# Patient Record
Sex: MALE | Race: WHITE | Employment: FULL TIME | URBAN - METROPOLITAN AREA
[De-identification: names, ages, dates, MRNs, and addresses within clinical notes are randomized per-mention and may not be internally consistent; named-entity substitution may affect disease eponyms.]

---

## 2017-02-28 ENCOUNTER — TELEPHONE (OUTPATIENT)
Dept: FAMILY MEDICINE CLINIC | Age: 32
End: 2017-02-28

## 2017-03-02 ENCOUNTER — OFFICE VISIT (OUTPATIENT)
Dept: FAMILY MEDICINE CLINIC | Age: 32
End: 2017-03-02

## 2017-03-02 VITALS
WEIGHT: 315 LBS | HEIGHT: 73 IN | DIASTOLIC BLOOD PRESSURE: 98 MMHG | HEART RATE: 68 BPM | SYSTOLIC BLOOD PRESSURE: 152 MMHG | RESPIRATION RATE: 16 BRPM | BODY MASS INDEX: 41.75 KG/M2

## 2017-03-02 DIAGNOSIS — E66.01 MORBID OBESITY WITH BMI OF 40.0-44.9, ADULT (HCC): ICD-10-CM

## 2017-03-02 DIAGNOSIS — Z90.5 SINGLE KIDNEY: ICD-10-CM

## 2017-03-02 DIAGNOSIS — I10 ESSENTIAL HYPERTENSION: Primary | ICD-10-CM

## 2017-03-02 PROCEDURE — 99213 OFFICE O/P EST LOW 20 MIN: CPT | Performed by: NURSE PRACTITIONER

## 2017-03-02 RX ORDER — LISINOPRIL 20 MG/1
TABLET ORAL
Qty: 30 TABLET | Refills: 1 | Status: CANCELLED | OUTPATIENT
Start: 2017-03-02

## 2017-03-02 RX ORDER — LISINOPRIL AND HYDROCHLOROTHIAZIDE 20; 12.5 MG/1; MG/1
1 TABLET ORAL DAILY
Qty: 30 TABLET | Refills: 3 | Status: SHIPPED | OUTPATIENT
Start: 2017-03-02 | End: 2017-07-04 | Stop reason: SDUPTHER

## 2017-03-02 ASSESSMENT — ENCOUNTER SYMPTOMS
SHORTNESS OF BREATH: 0
ABDOMINAL PAIN: 0
COUGH: 0
NAUSEA: 0

## 2017-03-02 ASSESSMENT — PATIENT HEALTH QUESTIONNAIRE - PHQ9
2. FEELING DOWN, DEPRESSED OR HOPELESS: 0
SUM OF ALL RESPONSES TO PHQ QUESTIONS 1-9: 0
1. LITTLE INTEREST OR PLEASURE IN DOING THINGS: 0
SUM OF ALL RESPONSES TO PHQ9 QUESTIONS 1 & 2: 0

## 2017-07-25 ENCOUNTER — OFFICE VISIT (OUTPATIENT)
Dept: FAMILY MEDICINE CLINIC | Age: 32
End: 2017-07-25
Payer: COMMERCIAL

## 2017-07-25 VITALS
HEART RATE: 68 BPM | SYSTOLIC BLOOD PRESSURE: 130 MMHG | HEIGHT: 74 IN | WEIGHT: 305.4 LBS | BODY MASS INDEX: 39.19 KG/M2 | DIASTOLIC BLOOD PRESSURE: 76 MMHG | RESPIRATION RATE: 16 BRPM

## 2017-07-25 DIAGNOSIS — Z90.5 SINGLE KIDNEY: ICD-10-CM

## 2017-07-25 DIAGNOSIS — R00.2 PALPITATIONS: ICD-10-CM

## 2017-07-25 DIAGNOSIS — I10 ESSENTIAL HYPERTENSION: ICD-10-CM

## 2017-07-25 DIAGNOSIS — R42 VERTIGO: Primary | ICD-10-CM

## 2017-07-25 DIAGNOSIS — E66.9 OBESITY (BMI 30-39.9): ICD-10-CM

## 2017-07-25 PROCEDURE — 99213 OFFICE O/P EST LOW 20 MIN: CPT | Performed by: NURSE PRACTITIONER

## 2017-07-25 ASSESSMENT — ENCOUNTER SYMPTOMS
COUGH: 0
ABDOMINAL PAIN: 0
SHORTNESS OF BREATH: 0
CHEST TIGHTNESS: 0
NAUSEA: 0

## 2017-07-26 LAB
ANION GAP SERPL CALCULATED.3IONS-SCNC: 12 MMOL/L
AVERAGE GLUCOSE: 103 MG/DL (ref 66–114)
BUN BLDV-MCNC: 19 MG/DL (ref 10–20)
CALCIUM SERPL-MCNC: 10.1 MG/DL (ref 8.7–10.8)
CHLORIDE BLD-SCNC: 103 MMOL/L (ref 95–111)
CHOLESTEROL/HDL RATIO: 3.9
CHOLESTEROL: 154 MG/DL
CO2: 28 MMOL/L (ref 21–32)
CREAT SERPL-MCNC: 1.3 MG/DL (ref 0.5–1.3)
EGFR AFRICAN AMERICAN: 77
EGFR IF NONAFRICAN AMERICAN: 64
GLUCOSE: 92 MG/DL (ref 70–100)
HBA1C MFR BLD: 5.2 % (ref 4.2–5.8)
HDLC SERPL-MCNC: 40 MG/DL (ref 40–60)
LDL CHOLESTEROL CALCULATED: 83 MG/DL
LDL/HDL RATIO: 2.1
POTASSIUM SERPL-SCNC: 4.8 MMOL/L (ref 3.5–5.4)
SODIUM BLD-SCNC: 138 MMOL/L (ref 134–147)
TRIGL SERPL-MCNC: 155 MG/DL
TSH SERPL DL<=0.05 MIU/L-ACNC: 2.1 UIU/ML (ref 0.4–4.4)
VLDLC SERPL CALC-MCNC: 31 MG/DL

## 2018-05-01 ENCOUNTER — OFFICE VISIT (OUTPATIENT)
Dept: FAMILY MEDICINE CLINIC | Age: 33
End: 2018-05-01
Payer: COMMERCIAL

## 2018-05-01 VITALS
SYSTOLIC BLOOD PRESSURE: 116 MMHG | OXYGEN SATURATION: 98 % | HEIGHT: 72 IN | BODY MASS INDEX: 40.19 KG/M2 | WEIGHT: 296.7 LBS | RESPIRATION RATE: 20 BRPM | HEART RATE: 86 BPM | DIASTOLIC BLOOD PRESSURE: 78 MMHG | TEMPERATURE: 98 F

## 2018-05-01 DIAGNOSIS — J31.0 RHINOSINUSITIS: Primary | ICD-10-CM

## 2018-05-01 DIAGNOSIS — J32.9 RHINOSINUSITIS: Primary | ICD-10-CM

## 2018-05-01 DIAGNOSIS — I10 ESSENTIAL HYPERTENSION: ICD-10-CM

## 2018-05-01 PROCEDURE — 99213 OFFICE O/P EST LOW 20 MIN: CPT | Performed by: NURSE PRACTITIONER

## 2018-05-01 RX ORDER — LISINOPRIL AND HYDROCHLOROTHIAZIDE 20; 12.5 MG/1; MG/1
TABLET ORAL
Qty: 90 TABLET | Refills: 3 | Status: SHIPPED | OUTPATIENT
Start: 2018-05-01 | End: 2019-06-02 | Stop reason: SDUPTHER

## 2018-05-01 RX ORDER — DOXYCYCLINE HYCLATE 100 MG
100 TABLET ORAL 2 TIMES DAILY
Qty: 20 TABLET | Refills: 0 | Status: SHIPPED | OUTPATIENT
Start: 2018-05-01 | End: 2018-05-11

## 2018-05-01 ASSESSMENT — ENCOUNTER SYMPTOMS
ABDOMINAL PAIN: 0
SORE THROAT: 0
RHINORRHEA: 1
SINUS PAIN: 1
SHORTNESS OF BREATH: 0
CHEST TIGHTNESS: 0
NAUSEA: 0
SINUS PRESSURE: 1
COUGH: 0

## 2018-05-01 ASSESSMENT — PATIENT HEALTH QUESTIONNAIRE - PHQ9
2. FEELING DOWN, DEPRESSED OR HOPELESS: 0
SUM OF ALL RESPONSES TO PHQ QUESTIONS 1-9: 0
SUM OF ALL RESPONSES TO PHQ9 QUESTIONS 1 & 2: 0
1. LITTLE INTEREST OR PLEASURE IN DOING THINGS: 0

## 2019-06-02 DIAGNOSIS — I10 ESSENTIAL HYPERTENSION: ICD-10-CM

## 2019-06-03 RX ORDER — LISINOPRIL AND HYDROCHLOROTHIAZIDE 20; 12.5 MG/1; MG/1
TABLET ORAL
Qty: 90 TABLET | Refills: 0 | Status: SHIPPED | OUTPATIENT
Start: 2019-06-03 | End: 2019-08-22 | Stop reason: SDUPTHER

## 2019-08-22 DIAGNOSIS — I10 ESSENTIAL HYPERTENSION: ICD-10-CM

## 2019-08-22 RX ORDER — LISINOPRIL AND HYDROCHLOROTHIAZIDE 20; 12.5 MG/1; MG/1
TABLET ORAL
Qty: 90 TABLET | Refills: 3 | Status: SHIPPED | OUTPATIENT
Start: 2019-08-22 | End: 2020-08-07

## 2020-01-21 ENCOUNTER — OFFICE VISIT (OUTPATIENT)
Dept: FAMILY MEDICINE CLINIC | Age: 35
End: 2020-01-21
Payer: COMMERCIAL

## 2020-01-21 VITALS
DIASTOLIC BLOOD PRESSURE: 80 MMHG | HEART RATE: 80 BPM | BODY MASS INDEX: 38.22 KG/M2 | RESPIRATION RATE: 16 BRPM | HEIGHT: 73 IN | WEIGHT: 288.4 LBS | SYSTOLIC BLOOD PRESSURE: 122 MMHG

## 2020-01-21 PROCEDURE — 99395 PREV VISIT EST AGE 18-39: CPT | Performed by: NURSE PRACTITIONER

## 2020-01-21 SDOH — ECONOMIC STABILITY: FOOD INSECURITY: WITHIN THE PAST 12 MONTHS, THE FOOD YOU BOUGHT JUST DIDN'T LAST AND YOU DIDN'T HAVE MONEY TO GET MORE.: NEVER TRUE

## 2020-01-21 SDOH — ECONOMIC STABILITY: TRANSPORTATION INSECURITY
IN THE PAST 12 MONTHS, HAS LACK OF TRANSPORTATION KEPT YOU FROM MEETINGS, WORK, OR FROM GETTING THINGS NEEDED FOR DAILY LIVING?: NO

## 2020-01-21 SDOH — ECONOMIC STABILITY: TRANSPORTATION INSECURITY
IN THE PAST 12 MONTHS, HAS THE LACK OF TRANSPORTATION KEPT YOU FROM MEDICAL APPOINTMENTS OR FROM GETTING MEDICATIONS?: NO

## 2020-01-21 SDOH — ECONOMIC STABILITY: FOOD INSECURITY: WITHIN THE PAST 12 MONTHS, YOU WORRIED THAT YOUR FOOD WOULD RUN OUT BEFORE YOU GOT MONEY TO BUY MORE.: NEVER TRUE

## 2020-01-21 SDOH — ECONOMIC STABILITY: INCOME INSECURITY: HOW HARD IS IT FOR YOU TO PAY FOR THE VERY BASICS LIKE FOOD, HOUSING, MEDICAL CARE, AND HEATING?: NOT HARD AT ALL

## 2020-01-21 ASSESSMENT — ENCOUNTER SYMPTOMS
COUGH: 0
NAUSEA: 0
ABDOMINAL PAIN: 0
SHORTNESS OF BREATH: 0

## 2020-01-21 ASSESSMENT — PATIENT HEALTH QUESTIONNAIRE - PHQ9
SUM OF ALL RESPONSES TO PHQ QUESTIONS 1-9: 0
1. LITTLE INTEREST OR PLEASURE IN DOING THINGS: 0
2. FEELING DOWN, DEPRESSED OR HOPELESS: 0
SUM OF ALL RESPONSES TO PHQ9 QUESTIONS 1 & 2: 0
SUM OF ALL RESPONSES TO PHQ QUESTIONS 1-9: 0

## 2020-01-21 NOTE — PROGRESS NOTES
Visit Information    Have you changed or started any medications since your last visit including any over-the-counter medicines, vitamins, or herbal medicines? no   Are you having any side effects from any of your medications? -  no  Have you stopped taking any of your medications? Is so, why? -  no    Have you seen any other physician or provider since your last visit? No  Have you had any other diagnostic tests since your last visit? No  Have you been seen in the emergency room and/or had an admission to a hospital since we last saw you? No  Have you had your routine dental cleaning in the past 6 months? no    Have you activated your [employer] account? If not, what are your barriers?  No: declines     Patient Care Team:  CHASIDY Arias CNP as PCP - General (Certified Nurse Practitioner)  CHASIDY Arias CNP as PCP - [facility] Provider    Medical History Review  Past Medical, Family, and Social History reviewed and does contribute to the patient presenting condition    Health Maintenance   Topic Date Due    Varicella Vaccine (1 of 2 - 2-dose childhood series) 05/07/1986    Pneumococcal 0-64 years Vaccine (1 of 1 - PPSV23) 05/07/1991    DTaP/Tdap/Td vaccine (1 - Tdap) 05/07/1996    HIV screen  05/07/2000    Potassium monitoring  12/30/2018    Creatinine monitoring  12/30/2018    Flu vaccine (1) 01/21/2021 (Originally 9/1/2019)

## 2020-01-21 NOTE — PROGRESS NOTES
Subjective:      Patient ID: Frankie Lenz is a 29 y.o. male. HPI: Annual Exam    Chief Complaint   Patient presents with    Annual Exam     discuss keto diet due to only having 1 kidney    Labs Only     He recently started on KETO diet. 120 g protein per day. 120 oz water per day. Lost 20+ pounds. Energy level good. Is concerned about kidney function as he has only 1 kidney. Anxiety is controlled via non-pharm. Denies medication aid.      Denies CP, SOB or chest tightness    He is on Lisinopril - HCTZ 20-12.5    BP Readings from Last 3 Encounters:   01/21/20 122/80   05/01/18 116/78   07/25/17 130/76     Due for screening Lab    Lab Results   Component Value Date    LABA1C 5.2 07/25/2017     No results found for: EAG    No components found for: CHLPL  Lab Results   Component Value Date    TRIG 155 (H) 07/25/2017     Lab Results   Component Value Date    HDL 40 07/25/2017     Lab Results   Component Value Date    LDLCALC 83 07/25/2017     Lab Results   Component Value Date    LABVLDL 31 (H) 07/25/2017         Chemistry        Component Value Date/Time     12/30/2017 2103    K 4.0 12/30/2017 2103     12/30/2017 2103    CO2 28 12/30/2017 2103    BUN 18 12/30/2017 2103    CREATININE 1.31 (H) 12/30/2017 2103        Component Value Date/Time    CALCIUM 9.8 12/30/2017 2103    ALKPHOS 73 12/30/2017 2103    AST 24 12/30/2017 2103    ALT 46 (H) 12/30/2017 2103    BILITOT 0.6 12/30/2017 2103            Lab Results   Component Value Date    TSH 3.23 12/30/2017       Lab Results   Component Value Date    WBC 9.9 12/30/2017    HGB 14.9 12/30/2017    HCT 44.2 12/30/2017    MCV 79.9 (L) 12/30/2017     12/30/2017         Health Maintenance   Topic Date Due    Varicella Vaccine (1 of 2 - 2-dose childhood series) 05/07/1986    Pneumococcal 0-64 years Vaccine (1 of 1 - PPSV23) 05/07/1991    DTaP/Tdap/Td vaccine (1 - Tdap) 05/07/1996    HIV screen  05/07/2000    Potassium monitoring  12/30/2018  Creatinine monitoring  12/30/2018    Flu vaccine (1) 01/21/2021 (Originally 9/1/2019)         There is no immunization history on file for this patient. Review of Systems   Constitutional: Negative for chills and fever. HENT: Negative. Respiratory: Negative for cough and shortness of breath. Cardiovascular: Negative for chest pain. Gastrointestinal: Negative for abdominal pain and nausea. Skin: Negative for rash. Neurological: Negative for dizziness, light-headedness and headaches. Psychiatric/Behavioral: The patient is nervous/anxious. Objective:   Physical Exam  Constitutional:       General: He is not in acute distress. Eyes:      Pupils: Pupils are equal, round, and reactive to light. Neck:      Musculoskeletal: Normal range of motion and neck supple. Cardiovascular:      Rate and Rhythm: Normal rate and regular rhythm. Heart sounds: No murmur. Pulmonary:      Effort: Pulmonary effort is normal.      Breath sounds: Normal breath sounds. No wheezing. Abdominal:      General: Bowel sounds are normal. There is no distension. Palpations: Abdomen is soft. Tenderness: There is no tenderness. Musculoskeletal: Normal range of motion. General: No tenderness. Skin:     General: Skin is warm and dry. Findings: No rash. Assessment:       Diagnosis Orders   1. Well adult exam  CBC    Comprehensive Metabolic Panel    Lipid Panel    Hemoglobin A1C    TSH with Reflex   2. Essential hypertension     3.  Single kidney  Basic Metabolic Panel           Plan:      Healthy Lifestyles discussed  KETO diet and FLUID status discussed  Screening Labs  Repeat BMP in 3 months  RTO if symptoms worsen or stay the same          Hue Stahl, APRN - CNP

## 2020-01-21 NOTE — PATIENT INSTRUCTIONS
You may receive a survey about your visit with us today. The feedback from our patients helps us identify what is working well and where the service to all patients can be enhanced. Thank you! Tobacco Cessation Programs     Telephonic behavior modification  Rohini Garzon ([phone])   Counseling service for those who are ready to quit using tobacco.     Available for uninsured [address] [place] [address] Dr residents, [place] recipients, pregnant women, or patients whose health plans or employers are members of the [address] [place] [address] behavior modification   http://[place]. Quitlogix. org   Online support program to help patients through each step of the quitting process. Available 24 hours a day 7 days a week. Provides up to date researched based tool, step-by-step guides, and motivational messages. Online behavior modification   www.lungusa.org/stop-smoking/how-to-quit   HelpLine: 1-800-LUNG-[place] (607-9747)   Email questions to:  Pedro Luis@[employer]. Codelearn    Website offers resources to help tobacco users figure out their reasons for quitting and then take the big step of quitting for good. Hypnosis   Location: [facility] [address]   Contact: Beatrice Alvarado, PhD at [phone]   Hypnosis for tobacco cessation   Cost $225 for the initial session and $175 for each session afterwards. Most patients require 6-8 sessions. There is the option to submit through the patients insurance. Hypnosis and behavior modification   Location: [place] [address],  [facility] [address] [place] [address]   Contact: Dariusz Singh, PhD at [phone]  [facility] and hypnosis for nicotine addition   Cost: For uninsured patients:  Please call above phone number  Cost for insured patients depends on patients insurance plan.     Behavior modification   Location: [facility], [address] [facility] [address] [place] [address], [address] [place] [address] Road: [place] [address] include four one-on-one appointments between the patient and a isn't right for everyone, because it can cause serious bleeding. · See your doctor regularly. You may need to see the doctor more often at first or until your blood pressure comes down. · If you are taking blood pressure medicine, talk to your doctor before you take decongestants or anti-inflammatory medicine, such as ibuprofen. Some of these medicines can raise blood pressure. · Learn how to check your blood pressure at home. Lifestyle changes  · Stay at a healthy weight. This is especially important if you put on weight around the waist. Losing even 10 pounds can help you lower your blood pressure. · If your doctor recommends it, get more exercise. Walking is a good choice. Bit by bit, increase the amount you walk every day. Try for at least 30 minutes on most days of the week. You also may want to swim, bike, or do other activities. · Avoid or limit alcohol. Talk to your doctor about whether you can drink any alcohol. · Try to limit how much sodium you eat to less than 2,300 milligrams (mg) a day. Your doctor may ask you to try to eat less than 1,500 mg a day. · Eat plenty of fruits (such as bananas and oranges), vegetables, legumes, whole grains, and low-fat dairy products. · Lower the amount of saturated fat in your diet. Saturated fat is found in animal products such as milk, cheese, and meat. Limiting these foods may help you lose weight and also lower your risk for heart disease. · Do not smoke. Smoking increases your risk for heart attack and stroke. If you need help quitting, talk to your doctor about stop-smoking programs and medicines. These can increase your chances of quitting for good. When should you call for help? Call  911 anytime you think you may need emergency care. This may mean having symptoms that suggest that your blood pressure is causing a serious heart or blood vessel problem.  Your blood pressure may be over 180/120.   For example, call  911 if:    · You have symptoms of a about a medical condition or this instruction, always ask your healthcare professional. [place] [address] any warranty or liability for your use of this information.

## 2020-08-07 RX ORDER — LISINOPRIL AND HYDROCHLOROTHIAZIDE 20; 12.5 MG/1; MG/1
TABLET ORAL
Qty: 90 TABLET | Refills: 0 | Status: SHIPPED | OUTPATIENT
Start: 2020-08-07 | End: 2020-11-13

## 2021-02-16 ENCOUNTER — VIRTUAL VISIT (OUTPATIENT)
Dept: FAMILY MEDICINE CLINIC | Age: 36
End: 2021-02-16
Payer: COMMERCIAL

## 2021-02-16 DIAGNOSIS — Z00.00 WELL ADULT EXAM: Primary | ICD-10-CM

## 2021-02-16 DIAGNOSIS — I10 ESSENTIAL HYPERTENSION: ICD-10-CM

## 2021-02-16 DIAGNOSIS — M79.675 GREAT TOE PAIN, LEFT: ICD-10-CM

## 2021-02-16 DIAGNOSIS — Z90.5 SINGLE KIDNEY: ICD-10-CM

## 2021-02-16 PROCEDURE — 99395 PREV VISIT EST AGE 18-39: CPT | Performed by: NURSE PRACTITIONER

## 2021-02-16 RX ORDER — PREDNISONE 50 MG/1
50 TABLET ORAL DAILY
Qty: 4 TABLET | Refills: 0 | Status: SHIPPED | OUTPATIENT
Start: 2021-02-16 | End: 2021-02-20

## 2021-02-16 ASSESSMENT — ENCOUNTER SYMPTOMS
COUGH: 0
ABDOMINAL PAIN: 0
SHORTNESS OF BREATH: 0
NAUSEA: 0

## 2021-02-16 NOTE — PROGRESS NOTES
Subjective:      Patient ID: Leny Castillo is a 28 y.o. male    HPI: Acute for     TELEHEALTH EVALUATION -- Audio/Visual (During [facility]-17 public health emergency)    Patient identification was verified at the start of the visit: Yes    Services were provided through a video synchronous discussion virtually to substitute for in-person clinic visit. Patient and provider were located at their individual homes. Patient has requested an audio/video evaluation for the following concern(s):    Chief Complaint   Patient presents with    Gout       Left big toe. Sensitive to touch. Denies redness or swelling. Worse when he is sitting up. Better when walking. Onset of 4-5 days. No issues in past.  Increase in protein shakes recent. No change in activity. No injury. Patient Active Problem List   Diagnosis    Hypertension    Obesity    Asthma    Single kidney     He has only 1 kidney.      Anxiety is controlled via non-pharm.   Denies medication aid.      Denies CP, SOB or chest tightness     He is on Lisinopril - HCTZ 20-12.5    BP Readings from Last 3 Encounters:   01/21/20 122/80   05/01/18 116/78   07/25/17 130/76     Due for annual labs    Lab Results   Component Value Date    LABA1C 5.2 07/25/2017     No results found for: EAG    No components found for: CHLPL  Lab Results   Component Value Date    TRIG 155 (H) 07/25/2017     Lab Results   Component Value Date    HDL 40 07/25/2017     Lab Results   Component Value Date    LDLCALC 83 07/25/2017     Lab Results   Component Value Date    LABVLDL 31 (H) 07/25/2017         Chemistry        Component Value Date/Time     12/30/2017 2103    K 4.0 12/30/2017 2103     12/30/2017 2103    CO2 28 12/30/2017 2103    BUN 18 12/30/2017 2103    CREATININE 1.31 (H) 12/30/2017 2103        Component Value Date/Time    CALCIUM 9.8 12/30/2017 2103    ALKPHOS 73 12/30/2017 2103    AST 24 12/30/2017 2103    ALT 46 (H) 12/30/2017 2103 BILITOT 0.6 12/30/2017 2103            Lab Results   Component Value Date    TSH 3.23 12/30/2017       Lab Results   Component Value Date    WBC 9.9 12/30/2017    HGB 14.9 12/30/2017    HCT 44.2 12/30/2017    MCV 79.9 (L) 12/30/2017     12/30/2017         Health Maintenance   Topic Date Due    Hepatitis C screen  1985    Varicella vaccine (1 of 2 - 2-dose childhood series) 05/07/1986    Pneumococcal 0-64 years Vaccine (1 of 1 - PPSV23) 05/07/1991    HIV screen  05/07/2000    DTaP/Tdap/Td vaccine (1 - Tdap) 05/07/2004    Potassium monitoring  12/30/2018    Creatinine monitoring  12/30/2018    Flu vaccine (1) 09/01/2020    Hepatitis A vaccine  Aged Out    Hepatitis B vaccine  Aged Out    Hib vaccine  Aged Out    Meningococcal (ACWY) vaccine  Aged Out         There is no immunization history on file for this patient. Review of Systems   Constitutional: Negative for chills and fever. HENT: Negative. Respiratory: Negative for cough and shortness of breath. Cardiovascular: Negative for chest pain. Gastrointestinal: Negative for abdominal pain and nausea. Musculoskeletal: Positive for arthralgias. Skin: Negative for rash. Neurological: Negative for dizziness, light-headedness and headaches. Psychiatric/Behavioral: Negative. Objective:   Physical Exam  Constitutional:       General: He is not in acute distress. Appearance: He is not ill-appearing. Pulmonary:      Effort: Pulmonary effort is normal. No respiratory distress. Neurological:      Mental Status: He is alert and oriented to person, place, and time. Psychiatric:         Mood and Affect: Mood normal.         Behavior: Behavior normal.         Assessment:       Diagnosis Orders   1. Well adult exam  CBC    Lipid Panel    Comprehensive Metabolic Panel    Hemoglobin A1C    TSH with Reflex    Urinalysis With Microscopic    Uric Acid   2.  Great toe pain, left  predniSONE (DELTASONE) 50 MG tablet

## 2021-11-24 ENCOUNTER — VIRTUAL VISIT (OUTPATIENT)
Dept: FAMILY MEDICINE CLINIC | Age: 36
End: 2021-11-24
Payer: COMMERCIAL

## 2021-11-24 DIAGNOSIS — K04.7 DENTAL INFECTION: Primary | ICD-10-CM

## 2021-11-24 PROCEDURE — 99213 OFFICE O/P EST LOW 20 MIN: CPT | Performed by: NURSE PRACTITIONER

## 2021-11-24 RX ORDER — AMOXICILLIN 875 MG/1
875 TABLET, COATED ORAL 2 TIMES DAILY
Qty: 20 TABLET | Refills: 0 | Status: SHIPPED | OUTPATIENT
Start: 2021-11-24 | End: 2021-12-04

## 2021-11-24 ASSESSMENT — ENCOUNTER SYMPTOMS
ABDOMINAL PAIN: 0
SHORTNESS OF BREATH: 0
COUGH: 0
NAUSEA: 0

## 2021-11-24 NOTE — PROGRESS NOTES
Subjective:      Patient ID: Yvette Molina is a 39 y.o. male    HPI: Acute for dental infection    TELEHEALTH EVALUATION -- Audio/Visual (During [facility]-57 public health emergency)    Patient identification was verified at the start of the visit: Yes    Services were provided through a video synchronous discussion virtually to substitute for in-person clinic visit. Patient and provider were located at their individual homes. Patient has requested an audio/video evaluation for the following concern(s):    Chief Complaint   Patient presents with    Dental Problem       Tooth abscess back right side. [place] tooth that needs pulled. Gums swelling, pain. Facial swelling. Onset of 3 days with symptoms. No fevers. No trouble swallowing. Pain is minimum. Unable to see dentist with holiday week. Patient Active Problem List   Diagnosis    Hypertension    Obesity    Asthma    Single kidney       Review of Systems   Constitutional: Negative for chills and fever. HENT: Positive for dental problem. Respiratory: Negative for cough and shortness of breath. Cardiovascular: Negative for chest pain. Gastrointestinal: Negative for abdominal pain and nausea. Skin: Negative for rash. Neurological: Negative for dizziness, light-headedness and headaches. Psychiatric/Behavioral: Negative. Objective:   Physical Exam  Constitutional:       General: He is not in acute distress. Appearance: He is not ill-appearing. Pulmonary:      Effort: Pulmonary effort is normal. No respiratory distress. Neurological:      Mental Status: He is alert and oriented to person, place, and time. Psychiatric:         Mood and Affect: Mood normal.         Behavior: Behavior normal.         Assessment:       Diagnosis Orders   1.  Dental infection  amoxicillin (AMOXIL) 875 MG tablet       Plan:     Orders as above   Fluids and rest  [place] gargles  RTO if symptoms worsen or stay the same             Due to this being a TeleHealth encounter (During [facility]-94 public health emergency), evaluation of the following organ systems was limited: Vitals/Constitutional/EENT/Resp/CV/GI//MS/Neuro/Skin/Heme-Lymph-Imm. Pursuant to the emergency declaration under the [address] [place] [address], [address] [place] [address] authority and the [employer] and [employer] Act, this Virtual Visit was conducted with patient's (and/or legal guardian's) consent, to reduce the patient's risk of exposure to COVID-19 and provide necessary medical care. The patient (and/or legal guardian) has also been advised to contact this office for worsening conditions or problems, and seek emergency medical treatment and/or call 911 if deemed necessary. --Huel Cockayne, APRN - CNP on 11/24/2021 at 1:28 PM    An electronic signature was used to authenticate this note.      11/24/2021

## 2022-05-23 DIAGNOSIS — I10 ESSENTIAL HYPERTENSION: ICD-10-CM

## 2022-05-23 RX ORDER — LISINOPRIL AND HYDROCHLOROTHIAZIDE 20; 12.5 MG/1; MG/1
TABLET ORAL
Qty: 90 TABLET | Refills: 1 | Status: SHIPPED | OUTPATIENT
Start: 2022-05-23 | End: 2022-11-01

## 2022-10-31 DIAGNOSIS — I10 ESSENTIAL HYPERTENSION: ICD-10-CM

## 2022-11-01 RX ORDER — LISINOPRIL AND HYDROCHLOROTHIAZIDE 20; 12.5 MG/1; MG/1
TABLET ORAL
Qty: 90 TABLET | Refills: 3 | Status: SHIPPED | OUTPATIENT
Start: 2022-11-01

## 2023-09-25 DIAGNOSIS — I10 ESSENTIAL HYPERTENSION: ICD-10-CM

## 2023-09-25 RX ORDER — LISINOPRIL AND HYDROCHLOROTHIAZIDE 20; 12.5 MG/1; MG/1
TABLET ORAL
Qty: 90 TABLET | Refills: 0 | OUTPATIENT
Start: 2023-09-25

## 2023-09-28 DIAGNOSIS — I10 ESSENTIAL HYPERTENSION: ICD-10-CM

## 2023-09-28 RX ORDER — LISINOPRIL AND HYDROCHLOROTHIAZIDE 20; 12.5 MG/1; MG/1
TABLET ORAL
Qty: 90 TABLET | Refills: 0 | OUTPATIENT
Start: 2023-09-28

## 2023-09-28 RX ORDER — LISINOPRIL AND HYDROCHLOROTHIAZIDE 20; 12.5 MG/1; MG/1
1 TABLET ORAL DAILY
Qty: 30 TABLET | Refills: 0 | Status: SHIPPED | OUTPATIENT
Start: 2023-09-28

## 2023-09-28 NOTE — TELEPHONE ENCOUNTER
Patient notified and understanding voiced. Appt given for 10/4/2023. Requesting refill until appt.   Will check with pharmacy tomorrow after 10am.

## 2023-10-04 ENCOUNTER — OFFICE VISIT (OUTPATIENT)
Dept: FAMILY MEDICINE CLINIC | Age: 38
End: 2023-10-04
Payer: COMMERCIAL

## 2023-10-04 VITALS
HEART RATE: 76 BPM | DIASTOLIC BLOOD PRESSURE: 86 MMHG | BODY MASS INDEX: 37.62 KG/M2 | RESPIRATION RATE: 16 BRPM | WEIGHT: 283.9 LBS | HEIGHT: 73 IN | SYSTOLIC BLOOD PRESSURE: 130 MMHG

## 2023-10-04 DIAGNOSIS — E66.01 SEVERE OBESITY (BMI 35.0-39.9) WITH COMORBIDITY (HCC): ICD-10-CM

## 2023-10-04 DIAGNOSIS — I10 ESSENTIAL HYPERTENSION: ICD-10-CM

## 2023-10-04 DIAGNOSIS — Z00.00 WELL ADULT EXAM: Primary | ICD-10-CM

## 2023-10-04 LAB
AVERAGE GLUCOSE: 114 MG/DL
HBA1C MFR BLD: 5.6 % (ref 4.2–5.6)

## 2023-10-04 PROCEDURE — 3075F SYST BP GE 130 - 139MM HG: CPT | Performed by: NURSE PRACTITIONER

## 2023-10-04 PROCEDURE — 99395 PREV VISIT EST AGE 18-39: CPT | Performed by: NURSE PRACTITIONER

## 2023-10-04 PROCEDURE — 3079F DIAST BP 80-89 MM HG: CPT | Performed by: NURSE PRACTITIONER

## 2023-10-04 RX ORDER — OMEPRAZOLE 20 MG/1
20 CAPSULE, DELAYED RELEASE ORAL DAILY
COMMUNITY

## 2023-10-04 RX ORDER — LISINOPRIL AND HYDROCHLOROTHIAZIDE 20; 12.5 MG/1; MG/1
1 TABLET ORAL DAILY
Qty: 90 TABLET | Refills: 3 | Status: SHIPPED | OUTPATIENT
Start: 2023-10-04

## 2023-10-04 SDOH — ECONOMIC STABILITY: INCOME INSECURITY: HOW HARD IS IT FOR YOU TO PAY FOR THE VERY BASICS LIKE FOOD, HOUSING, MEDICAL CARE, AND HEATING?: NOT HARD AT ALL

## 2023-10-04 SDOH — ECONOMIC STABILITY: FOOD INSECURITY: WITHIN THE PAST 12 MONTHS, YOU WORRIED THAT YOUR FOOD WOULD RUN OUT BEFORE YOU GOT MONEY TO BUY MORE.: NEVER TRUE

## 2023-10-04 SDOH — ECONOMIC STABILITY: FOOD INSECURITY: WITHIN THE PAST 12 MONTHS, THE FOOD YOU BOUGHT JUST DIDN'T LAST AND YOU DIDN'T HAVE MONEY TO GET MORE.: NEVER TRUE

## 2023-10-04 SDOH — ECONOMIC STABILITY: HOUSING INSECURITY
IN THE LAST 12 MONTHS, WAS THERE A TIME WHEN YOU DID NOT HAVE A STEADY PLACE TO SLEEP OR SLEPT IN A SHELTER (INCLUDING NOW)?: NO

## 2023-10-04 ASSESSMENT — PATIENT HEALTH QUESTIONNAIRE - PHQ9
SUM OF ALL RESPONSES TO PHQ QUESTIONS 1-9: 0
SUM OF ALL RESPONSES TO PHQ9 QUESTIONS 1 & 2: 0
2. FEELING DOWN, DEPRESSED OR HOPELESS: 0
1. LITTLE INTEREST OR PLEASURE IN DOING THINGS: 0

## 2023-10-04 ASSESSMENT — ENCOUNTER SYMPTOMS: SHORTNESS OF BREATH: 0

## 2023-10-04 NOTE — PROGRESS NOTES
Subjective:      Patient ID: Lu Correia is a 45 y.o. male    HPI: Annual Exam    Chief Complaint   Patient presents with    Annual Exam    Neck Pain     Arthritis? Weight Management       5 days a week at the gym. Did this for 14 weeks straight. Lack of results seen with weight loss. Wonders about low testosterone. Complains of neck pain. No benefit with chiropractor. Avoid NSAID due to solitary kidney. Tightness in neck worse as day goes on. Denies shooting pain or arm weakness. Patient Active Problem List   Diagnosis    Hypertension    Obesity    Asthma    Single kidney     He has only 1 kidney. Anxiety is controlled via non-pharm. Denies medication aid.       Denies CP, SOB or chest tightness     He is on Lisinopril - HCTZ 20-12.5    BP Readings from Last 3 Encounters:   10/04/23 130/86   01/21/20 122/80   05/01/18 116/78     Due for annual labs    Lab Results   Component Value Date    LABA1C 5.2 07/25/2017     No results found for: \"EAG\"    No components found for: \"CHLPL\"  Lab Results   Component Value Date    TRIG 155 (H) 07/25/2017     Lab Results   Component Value Date    HDL 40 07/25/2017     Lab Results   Component Value Date    LDLCALC 83 07/25/2017     Lab Results   Component Value Date    LABVLDL 31 (H) 07/25/2017         Chemistry        Component Value Date/Time     12/30/2017 2103    K 4.0 12/30/2017 2103     12/30/2017 2103    CO2 28 12/30/2017 2103    BUN 18 12/30/2017 2103    CREATININE 1.31 (H) 12/30/2017 2103        Component Value Date/Time    CALCIUM 9.8 12/30/2017 2103    ALKPHOS 73 12/30/2017 2103    AST 24 12/30/2017 2103    ALT 46 (H) 12/30/2017 2103    BILITOT 0.6 12/30/2017 2103            Lab Results   Component Value Date    TSH 3.23 12/30/2017       Lab Results   Component Value Date    WBC 9.9 12/30/2017    HGB 14.9 12/30/2017    HCT 44.2 12/30/2017    MCV 79.9 (L) 12/30/2017     12/30/2017         Health Maintenance   Topic Date Due

## 2023-10-05 LAB
ABSOLUTE BASO #: 0.06 K/UL (ref 0–0.2)
ABSOLUTE EOS #: 0.26 K/UL (ref 0–0.5)
ABSOLUTE LYMPH #: 2.23 K/UL (ref 1–4)
ABSOLUTE MONO #: 0.47 K/UL (ref 0.2–1)
ABSOLUTE NEUT #: 4.13 K/UL (ref 1.5–7.5)
ALBUMIN SERPL-MCNC: 4.6 G/DL (ref 3.5–5.2)
ALK PHOSPHATASE: 78 U/L (ref 40–117)
ALT SERPL-CCNC: 25 U/L (ref 5–50)
ANION GAP SERPL CALCULATED.3IONS-SCNC: 13 MEQ/L (ref 7–16)
AST SERPL-CCNC: 16 U/L (ref 9–50)
BASOPHILS RELATIVE PERCENT: 0.8 %
BILIRUB SERPL-MCNC: 0.2 MG/DL
BUN BLDV-MCNC: 27 MG/DL (ref 6–20)
CALCIUM SERPL-MCNC: 9.6 MG/DL (ref 8.5–10.5)
CHLORIDE BLD-SCNC: 102 MEQ/L (ref 95–107)
CHOLESTEROL/HDL RATIO: 2.7 RATIO
CHOLESTEROL: 175 MG/DL
CO2: 24 MEQ/L (ref 19–31)
CREAT SERPL-MCNC: 1.24 MG/DL (ref 0.8–1.4)
EGFR IF NONAFRICAN AMERICAN: 76 ML/MIN/1.73
EOSINOPHILS RELATIVE PERCENT: 3.6 %
GLUCOSE: 94 MG/DL (ref 70–99)
HCT VFR BLD CALC: 44.6 % (ref 40–51)
HDLC SERPL-MCNC: 64 MG/DL
HEMOGLOBIN: 15 G/DL (ref 13.5–17)
LDL CHOLESTEROL CALCULATED: 101 MG/DL
LDL/HDL RATIO: 1.6 RATIO
LYMPHOCYTE %: 31.1 %
MCH RBC QN AUTO: 26.6 PG (ref 25–33)
MCHC RBC AUTO-ENTMCNC: 33.6 G/DL (ref 31–36)
MCV RBC AUTO: 79.1 FL (ref 80–99)
MONOCYTES # BLD: 6.5 %
NEUTROPHILS RELATIVE PERCENT: 57.6 %
PDW BLD-RTO: 13.3 % (ref 11.5–15)
PLATELETS: 219 K/UL (ref 130–400)
PMV BLD AUTO: 10.6 FL (ref 9.3–13)
POTASSIUM SERPL-SCNC: 4.7 MEQ/L (ref 3.5–5.4)
RBC: 5.64 M/UL (ref 4.5–6.1)
SODIUM BLD-SCNC: 139 MEQ/L (ref 133–146)
TOTAL PROTEIN: 6.8 G/DL (ref 6.1–8.3)
TRIGL SERPL-MCNC: 49 MG/DL
TSH SERPL DL<=0.05 MIU/L-ACNC: 3.48 UIU/ML (ref 0.4–4.1)
URIC ACID: 8.2 MG/DL (ref 3.7–8)
VLDLC SERPL CALC-MCNC: 10 MG/DL
WBC: 7.2 K/UL (ref 3.5–11)

## 2023-10-06 LAB
SEX HORMONE BINDING GLOBULIN: 17 NMOL/L (ref 16.5–55.9)
TESTOSTERONE FREE, CALC: 88.7 PG/ML (ref 47–244)
TESTOSTERONE FREE: 323 NG/DL (ref 300–1080)

## 2024-07-26 ENCOUNTER — HOSPITAL ENCOUNTER (EMERGENCY)
Age: 39
Discharge: HOME | End: 2024-07-26
Payer: COMMERCIAL

## 2024-07-26 VITALS
HEIGHT: 73 IN | SYSTOLIC BLOOD PRESSURE: 115 MMHG | WEIGHT: 290 LBS | DIASTOLIC BLOOD PRESSURE: 81 MMHG | BODY MASS INDEX: 38.43 KG/M2 | HEART RATE: 84 BPM | RESPIRATION RATE: 18 BRPM | OXYGEN SATURATION: 96 % | TEMPERATURE: 97.5 F

## 2024-07-26 DIAGNOSIS — L08.9 LOCAL INFECTION OF SKIN AND SUBCUTANEOUS TISSUE: Primary | ICD-10-CM

## 2024-07-26 PROCEDURE — 99203 OFFICE O/P NEW LOW 30 MIN: CPT

## 2024-07-26 PROCEDURE — 87205 SMEAR GRAM STAIN: CPT

## 2024-07-26 PROCEDURE — 99213 OFFICE O/P EST LOW 20 MIN: CPT

## 2024-07-26 PROCEDURE — 87147 CULTURE TYPE IMMUNOLOGIC: CPT

## 2024-07-26 PROCEDURE — 87070 CULTURE OTHR SPECIMN AEROBIC: CPT

## 2024-07-26 RX ORDER — FAMOTIDINE 20 MG/1
20 TABLET, FILM COATED ORAL 2 TIMES DAILY
COMMUNITY

## 2024-07-26 RX ORDER — DOXYCYCLINE HYCLATE 100 MG
100 TABLET ORAL 2 TIMES DAILY
Qty: 20 TABLET | Refills: 0 | Status: SHIPPED | OUTPATIENT
Start: 2024-07-26 | End: 2024-08-05

## 2024-07-26 ASSESSMENT — PAIN SCALES - GENERAL: PAINLEVEL_OUTOF10: 10

## 2024-07-26 ASSESSMENT — PAIN DESCRIPTION - PAIN TYPE: TYPE: ACUTE PAIN

## 2024-07-26 ASSESSMENT — PAIN DESCRIPTION - ONSET: ONSET: ON-GOING

## 2024-07-26 ASSESSMENT — PAIN DESCRIPTION - ORIENTATION: ORIENTATION: RIGHT

## 2024-07-26 ASSESSMENT — PAIN - FUNCTIONAL ASSESSMENT
PAIN_FUNCTIONAL_ASSESSMENT: PREVENTS OR INTERFERES SOME ACTIVE ACTIVITIES AND ADLS
PAIN_FUNCTIONAL_ASSESSMENT: 0-10

## 2024-07-26 ASSESSMENT — PAIN DESCRIPTION - FREQUENCY: FREQUENCY: INTERMITTENT

## 2024-07-26 ASSESSMENT — PAIN DESCRIPTION - DESCRIPTORS: DESCRIPTORS: SHARP

## 2024-07-26 ASSESSMENT — PAIN DESCRIPTION - LOCATION: LOCATION: TOE (COMMENT WHICH ONE)

## 2024-07-26 NOTE — DISCHARGE INSTRUCTIONS
Culture results in 24-48 hours.  Antibiotic as prescribed.  Continue to keep clean and covered.   Warm compress, Epsin salt soaks.  Increase water intake, frequent hand washing.  Tylenol / Ibuprofen as needed for fever and or pain.  Follow up with PCP in 3-5 days if no improvement or sooner with worsening symptoms.

## 2024-07-26 NOTE — ED PROVIDER NOTES
[facility] URGENT CARE  Urgent Care Encounter       CHIEF COMPLAINT       Chief Complaint   Patient presents with    Toe Pain     Right great toe pain and redness for about 9 weeks       Nurses Notes reviewed and I agree except as noted in the HPI.  HISTORY OF PRESENT ILLNESS   Rubén Walters is a 39 y.o. male who presents with concerns of right great toe pain and redness for the past nine weeks. Patient reports originally had drainage from this area when first occurred, has been applying OTC Neosporin and Band-Aid. [place] he was at the ocean two weeks ago and believes the salt water helped his symptoms.      HPI    REVIEW OF SYSTEMS     Review of Systems   Constitutional:  Negative for fatigue and fever.   Musculoskeletal:         Right great toe redness, swelling, pain to lateral aspect of nail   All other systems reviewed and are negative.      PAST MEDICAL HISTORY         Diagnosis Date    Anxiety     Hypertension     Obesity        SURGICALHISTORY     Patient  has a past surgical history that includes Kidney removal and Neck surgery.    CURRENT MEDICATIONS       Discharge Medication List as of 7/26/2024  7:33 PM        CONTINUE these medications which have NOT CHANGED    Details   famotidine (PEPCID) 20 MG tablet Take 1 tablet by mouth 2 times dailyHistorical Med      lisinopril-hydroCHLOROthiazide (PRINZIDE;ZESTORETIC) 20-12.5 MG per tablet Take 1 tablet by mouth daily, Disp-90 tablet, R-3Normal             ALLERGIES     Patient is is allergic to amlodipine and suprax [cefixime].    Patients   There is no immunization history on file for this patient.    FAMILY HISTORY     Patient's family history includes Depression in his mother; High Blood Pressure in his mother; Thyroid Disease in his mother.    SOCIAL HISTORY     Patient  reports that he has never smoked. His smokeless tobacco use includes chew. He reports current alcohol use. He reports that he does not use drugs.    PHYSICAL EXAM     ED  TRIAGE VITALS  BP: 115/81, Temp: 97.5 °F (36.4 °C), Pulse: 84, Respirations: 18, SpO2: 96 %,Estimated body mass index is 38.26 kg/m² as calculated from the following:    Height as of this encounter: 1.854 m (6' 1\").    Weight as of this encounter: 131.5 kg (290 lb).,No LMP for male patient.    Physical Exam  Vitals and nursing note reviewed.   Constitutional:       General: He is not in acute distress.     Appearance: Normal appearance. He is obese. He is not ill-appearing, toxic-appearing or diaphoretic.   Eyes:      Pupils: Pupils are equal, round, and reactive to light.   Cardiovascular:      Rate and Rhythm: Normal rate and regular rhythm.      Pulses:           Dorsalis pedis pulses are 2+ on the right side.        Posterior tibial pulses are 2+ on the right side.      Heart sounds: Normal heart sounds.   Pulmonary:      Effort: Pulmonary effort is normal.   Musculoskeletal:        Feet:    Feet:      Right foot:      Skin integrity: Erythema and warmth present.      Toenail Condition: Right toenails are ingrown.   Skin:     General: Skin is warm and dry.      Capillary Refill: Capillary refill takes less than 2 seconds.      Findings: Abrasion and erythema present.          Neurological:      General: No focal deficit present.      Mental Status: He is alert.   Psychiatric:         Mood and Affect: Mood normal.         DIAGNOSTIC RESULTS     Labs:No results found for this visit on 07/26/24.    IMAGING:    No orders to display         EKG:      URGENT CARE COURSE:     Vitals:    07/26/24 1918   BP: 115/81   Pulse: 84   Resp: 18   Temp: 97.5 °F (36.4 °C)   TempSrc: Temporal   SpO2: 96%   Weight: 131.5 kg (290 lb)   Height: 1.854 m (6' 1\")       Medications - No data to display         PROCEDURES:  None    FINAL IMPRESSION      1. Local infection of skin and subcutaneous tissue          DISPOSITION/ PLAN     Patient seen and evaluated for the above. Exam suggestive of local skin infection or paronychia. Drainage

## 2024-07-26 NOTE — ED NOTES
PT GIVEN DISCHARGE INSTRUCTIONS, VERBALIZES UNDERSTANDING.  PT ASSESSMENT UNCHANGED, DISCHARGED IN STABLE CONDITION.        Negrito Clinton, RN  07/26/24 [id number]

## 2024-07-28 LAB
BACTERIA SPEC AEROBE CULT: NORMAL
GRAM STN SPEC: NORMAL

## 2024-07-29 ENCOUNTER — TELEPHONE (OUTPATIENT)
Dept: FAMILY MEDICINE CLINIC | Age: 39
End: 2024-07-29

## 2024-09-09 DIAGNOSIS — I10 ESSENTIAL HYPERTENSION: ICD-10-CM

## 2024-09-10 RX ORDER — LISINOPRIL AND HYDROCHLOROTHIAZIDE 12.5; 2 MG/1; MG/1
1 TABLET ORAL DAILY
Qty: 90 TABLET | Refills: 3 | Status: SHIPPED | OUTPATIENT
Start: 2024-09-10

## 2025-07-13 DIAGNOSIS — I10 ESSENTIAL HYPERTENSION: ICD-10-CM

## 2025-07-13 RX ORDER — LISINOPRIL AND HYDROCHLOROTHIAZIDE 12.5; 2 MG/1; MG/1
1 TABLET ORAL DAILY
Qty: 90 TABLET | Refills: 0 | OUTPATIENT
Start: 2025-07-13

## 2025-07-15 RX ORDER — LISINOPRIL AND HYDROCHLOROTHIAZIDE 12.5; 2 MG/1; MG/1
1 TABLET ORAL DAILY
Qty: 30 TABLET | Refills: 0 | Status: SHIPPED | OUTPATIENT
Start: 2025-07-15

## 2025-07-15 NOTE — TELEPHONE ENCOUNTER
Patient called and scheduled an appointment. He is scheduled for 7/22/25. He would like a refill at least to get him to that appointment.

## 2025-07-22 ENCOUNTER — OFFICE VISIT (OUTPATIENT)
Dept: FAMILY MEDICINE CLINIC | Age: 40
End: 2025-07-22
Payer: COMMERCIAL

## 2025-07-22 VITALS
SYSTOLIC BLOOD PRESSURE: 134 MMHG | BODY MASS INDEX: 40.56 KG/M2 | HEIGHT: 73 IN | RESPIRATION RATE: 16 BRPM | WEIGHT: 306 LBS | HEART RATE: 88 BPM | DIASTOLIC BLOOD PRESSURE: 70 MMHG

## 2025-07-22 DIAGNOSIS — E66.01 MORBID OBESITY WITH BMI OF 40.0-44.9, ADULT (HCC): ICD-10-CM

## 2025-07-22 DIAGNOSIS — Z00.00 WELL ADULT EXAM: Primary | ICD-10-CM

## 2025-07-22 DIAGNOSIS — I10 ESSENTIAL HYPERTENSION: ICD-10-CM

## 2025-07-22 PROCEDURE — 99396 PREV VISIT EST AGE 40-64: CPT | Performed by: NURSE PRACTITIONER

## 2025-07-22 PROCEDURE — 3078F DIAST BP <80 MM HG: CPT | Performed by: NURSE PRACTITIONER

## 2025-07-22 PROCEDURE — 3075F SYST BP GE 130 - 139MM HG: CPT | Performed by: NURSE PRACTITIONER

## 2025-07-22 RX ORDER — PHENTERMINE HYDROCHLORIDE 37.5 MG/1
37.5 CAPSULE ORAL EVERY MORNING
Qty: 30 CAPSULE | Refills: 0 | Status: SHIPPED | OUTPATIENT
Start: 2025-07-22 | End: 2025-08-21

## 2025-07-22 RX ORDER — LISINOPRIL AND HYDROCHLOROTHIAZIDE 12.5; 2 MG/1; MG/1
1 TABLET ORAL DAILY
Qty: 90 TABLET | Refills: 3 | Status: SHIPPED | OUTPATIENT
Start: 2025-07-22

## 2025-07-22 SDOH — ECONOMIC STABILITY: FOOD INSECURITY: WITHIN THE PAST 12 MONTHS, YOU WORRIED THAT YOUR FOOD WOULD RUN OUT BEFORE YOU GOT MONEY TO BUY MORE.: NEVER TRUE

## 2025-07-22 SDOH — ECONOMIC STABILITY: FOOD INSECURITY: WITHIN THE PAST 12 MONTHS, THE FOOD YOU BOUGHT JUST DIDN'T LAST AND YOU DIDN'T HAVE MONEY TO GET MORE.: NEVER TRUE

## 2025-07-22 ASSESSMENT — PATIENT HEALTH QUESTIONNAIRE - PHQ9
2. FEELING DOWN, DEPRESSED OR HOPELESS: NOT AT ALL
SUM OF ALL RESPONSES TO PHQ QUESTIONS 1-9: 0
SUM OF ALL RESPONSES TO PHQ QUESTIONS 1-9: 0
1. LITTLE INTEREST OR PLEASURE IN DOING THINGS: NOT AT ALL
SUM OF ALL RESPONSES TO PHQ QUESTIONS 1-9: 0
SUM OF ALL RESPONSES TO PHQ QUESTIONS 1-9: 0

## 2025-07-22 ASSESSMENT — ENCOUNTER SYMPTOMS: SHORTNESS OF BREATH: 0

## 2025-07-22 NOTE — PROGRESS NOTES
Subjective:      Patient ID: Rubén Walters is a 40 y.o. male    HPI: Annual Exam    Chief Complaint   Patient presents with    Annual Exam    Medication Refill       Patient Active Problem List   Diagnosis    Hypertension    Obesity    Asthma    Single kidney     He has only 1 kidney.      Anxiety is controlled via non-pharm.  Denies medication aid.      Denies CP, SOB or chest tightness.  Struggling with weight loss.  10 months was lifting weights with minimal weight loss.   Complaints of fatigue.  Snoring at night.      He is on Lisinopril - HCTZ 20-12.5    BP Readings from Last 3 Encounters:   07/22/25 134/70   07/26/24 115/81   10/04/23 130/86       Due for annual labs.  Tart Cherry for Uric acid.   No gout attacks.     Lab Results   Component Value Date    LABA1C 5.6 10/04/2023    LABA1C 5.2 07/25/2017     Lab Results   Component Value Date     10/04/2023       No components found for: \"CHLPL\"  Lab Results   Component Value Date    TRIG 49 10/04/2023    TRIG 155 (H) 07/25/2017     Lab Results   Component Value Date    HDL 64 10/04/2023    HDL 40 07/25/2017     No components found for: \"LDLCALC\"    No components found for: \"LABVLDL\"        Chemistry        Component Value Date/Time     10/04/2023 1000    K 4.7 10/04/2023 1000     10/04/2023 1000    CO2 24 10/04/2023 1000    BUN 27 (H) 10/04/2023 1000    CREATININE 1.24 10/04/2023 1000        Component Value Date/Time    CALCIUM 9.6 10/04/2023 1000    ALKPHOS 78 10/04/2023 1000    ALKPHOS 73 12/30/2017 2103    AST 16 10/04/2023 1000    ALT 25 10/04/2023 1000    BILITOT 0.2 10/04/2023 1000            Lab Results   Component Value Date    TSH 3.48 10/04/2023       Lab Results   Component Value Date    WBC 7.2 10/04/2023    HGB 15.0 10/04/2023    HCT 44.6 10/04/2023    MCV 79.1 (L) 10/04/2023     10/04/2023         Health Maintenance   Topic Date Due    Varicella vaccine (1 of 2 - 13+ 2-dose series) Never done    HIV screen  Never done

## 2025-07-24 ENCOUNTER — TELEPHONE (OUTPATIENT)
Dept: FAMILY MEDICINE CLINIC | Age: 40
End: 2025-07-24

## 2025-07-24 NOTE — TELEPHONE ENCOUNTER
Spouse Taniya on HIPAA called office stating pt just had his annual and needs a Wellness Form filled out for his employer. He will get $400. Pt will drop the form off tomorrow morning before we close at noon.

## 2025-08-19 ENCOUNTER — OFFICE VISIT (OUTPATIENT)
Dept: FAMILY MEDICINE CLINIC | Age: 40
End: 2025-08-19
Payer: COMMERCIAL

## 2025-08-19 VITALS
RESPIRATION RATE: 16 BRPM | SYSTOLIC BLOOD PRESSURE: 122 MMHG | HEART RATE: 84 BPM | WEIGHT: 289 LBS | BODY MASS INDEX: 38.39 KG/M2 | DIASTOLIC BLOOD PRESSURE: 76 MMHG

## 2025-08-19 DIAGNOSIS — I10 ESSENTIAL HYPERTENSION: ICD-10-CM

## 2025-08-19 DIAGNOSIS — E66.01 MORBID OBESITY WITH BMI OF 40.0-44.9, ADULT (HCC): Primary | ICD-10-CM

## 2025-08-19 DIAGNOSIS — K64.8 INTERNAL HEMORRHOID: ICD-10-CM

## 2025-08-19 PROCEDURE — 99214 OFFICE O/P EST MOD 30 MIN: CPT | Performed by: NURSE PRACTITIONER

## 2025-08-19 PROCEDURE — 3074F SYST BP LT 130 MM HG: CPT | Performed by: NURSE PRACTITIONER

## 2025-08-19 PROCEDURE — 3078F DIAST BP <80 MM HG: CPT | Performed by: NURSE PRACTITIONER

## 2025-08-19 RX ORDER — PHENTERMINE HYDROCHLORIDE 37.5 MG/1
37.5 CAPSULE ORAL EVERY MORNING
Qty: 30 CAPSULE | Refills: 0 | Status: SHIPPED | OUTPATIENT
Start: 2025-08-19 | End: 2025-09-18

## 2025-08-19 RX ORDER — HYDROCORTISONE ACETATE 25 MG/1
25 SUPPOSITORY RECTAL EVERY 12 HOURS
Qty: 12 SUPPOSITORY | Refills: 0 | Status: SHIPPED | OUTPATIENT
Start: 2025-08-19

## 2025-08-19 ASSESSMENT — ENCOUNTER SYMPTOMS: SHORTNESS OF BREATH: 0

## 2025-08-20 LAB
ALBUMIN: 4.2 G/DL (ref 3.5–5.2)
ALK PHOSPHATASE: 77 U/L (ref 39–118)
ALT SERPL-CCNC: 43 U/L (ref 5–41)
ANION GAP SERPL CALCULATED.3IONS-SCNC: 13 MMOL/L (ref 7–16)
AST SERPL-CCNC: 21 U/L (ref 9–50)
BASOPHILS ABSOLUTE: 0.07 K/UL (ref 0–0.2)
BASOPHILS RELATIVE PERCENT: 1.1 % (ref 0–2)
BILIRUB SERPL-MCNC: 0.4 MG/DL
BUN BLDV-MCNC: 14 MG/DL (ref 6–20)
CALCIUM SERPL-MCNC: 9.6 MG/DL (ref 8.6–10.5)
CHLORIDE BLD-SCNC: 104 MMOL/L (ref 96–107)
CHOLESTEROL, TOTAL: 157 MG/DL (ref 100–199)
CHOLESTEROL/HDL RATIO: 3.5 (ref 2–4.5)
CO2: 23 MMOL/L (ref 18–32)
CREAT SERPL-MCNC: 1.43 MG/DL (ref 0.67–1.3)
EGFR IF NONAFRICAN AMERICAN: 64 ML/MIN/1.73M2
EOSINOPHILS ABSOLUTE: 0.26 K/UL (ref 0–0.8)
EOSINOPHILS RELATIVE PERCENT: 4.3 % (ref 0–5)
ESTIMATED AVERAGE GLUCOSE: 111 MG/DL
GLUCOSE: 85 MG/DL (ref 70–100)
HBA1C MFR BLD: 5.5 %
HCT VFR BLD CALC: 44 % (ref 39–52)
HDLC SERPL-MCNC: 45 MG/DL
HEMOGLOBIN: 14.4 G/DL (ref 13–18)
IMMATURE GRANS (ABS): 0.01 K/UL (ref 0–0.06)
IMMATURE GRANULOCYTES %: 0.2 % (ref 0–2)
LDL CHOLESTEROL: 93 MG/DL
LDL/HDL RATIO: 2.1
LYMPHOCYTES ABSOLUTE: 2.24 K/UL (ref 0.9–5.2)
LYMPHOCYTES RELATIVE PERCENT: 36.8 % (ref 20–45)
MCH RBC QN AUTO: 27.2 PG (ref 26–32)
MCHC RBC AUTO-ENTMCNC: 32.7 G/DL (ref 31–36)
MCV RBC AUTO: 83 FL (ref 75–100)
MONOCYTES ABSOLUTE: 0.48 K/UL (ref 0.1–1)
MONOCYTES RELATIVE PERCENT: 7.9 % (ref 0–13)
NEUTROPHILS ABSOLUTE: 3.03 K/UL (ref 1.9–8)
NEUTROPHILS RELATIVE PERCENT: 49.7 % (ref 45–75)
PDW BLD-RTO: 13.5 % (ref 11.2–14.8)
PLATELET # BLD: 228 THOUS/CMM (ref 140–440)
POTASSIUM SERPL-SCNC: 4.8 MMOL/L (ref 3.5–5.4)
RBC # BLD: 5.29 MILL/CMM (ref 4.4–6.1)
SEX HORMONE BINDING GLOBULIN: 15.4 NMOL/L (ref 19.3–76.4)
SODIUM BLD-SCNC: 140 MMOL/L (ref 135–148)
TESTOSTERONE FREE, CALC: 85.5 PG/ML (ref 48–250)
TESTOSTERONE TOTAL: 302 NG/DL (ref 249–836)
TOTAL PROTEIN: 6.7 G/DL (ref 6–8.3)
TRIGL SERPL-MCNC: 95 MG/DL (ref 20–149)
TSH SERPL DL<=0.05 MIU/L-ACNC: 2.28 UIU/ML (ref 0.27–4.2)
URIC ACID: 8.2 MG/DL (ref 3.4–7)
VLDLC SERPL CALC-MCNC: 19 MG/DL
WBC # BLD: 6.1 THDS/CMM (ref 3.6–11)